# Patient Record
(demographics unavailable — no encounter records)

---

## 2025-02-27 NOTE — ASSESSMENT
[FreeTextEntry1] : 59 year old male with past medical history of CLL, HTN, Diabetes Mellitus Type 2, who presents for management of his diabetes  1. DM 2- controlled, uncomplicated Patient counseled on the importance of diabetic control and complications. Patient's diet and the necessary changes discussed. Reviewed over freestyle maxine and use. Reviewed over glycemic goals.  Cont Metformin  mg QD Check fsg Cont diabetic diet Cont exercise Labs UTD Opthalmology Visit UTD Foot Exam up to date  2. Elevated TSH Patients father has thyroid disease Antibodies negative Patient with no symptoms Will send for US Will monior for now

## 2025-02-27 NOTE — HISTORY OF PRESENT ILLNESS
[FreeTextEntry1] : Mr. HAZEL HERNANDEZ  is a 60 year old male with past medical history of CLL, HTN, Diabetes Mellitus Type 2  who presents for management of his diabetes.  POCT Glucose:  mg/dL POCT Hga1c:%   Diabetes first diagnosed: 2017 Type: 2  Current diabetic regimen: Metformin 500 mg BID  Other relevant medications:  Self monitoring blood glucose : Freestyle Dawson  AGP Report:  (2/13/25-2/26/25) High: 4% Target (): 96% Low: 0%  Hypoglycemia: none  Diet: limited carbs   Exercise:none  Urine micro:wnl (2/2023), wnl (5/2022), prot/cre 0.4 (2/2022) lipid profile:LDL 65 (2/2023)LDL 63 (5/2022), LDL 58 (4/2021), LDL 67 (11/2018) last hba1c:6.9% (2/2023), 6% (9/2022), 6.3% (5/2022), 7% (2/2022), 6.2% (7/2021), 6.2% (4/2021),  6.8% (1/2019) eye exam:2024 diabetic foot exam/podiatry:6/2024 in office

## 2025-02-27 NOTE — PHYSICAL EXAM
[Alert] : alert [No Acute Distress] : no acute distress [Well Developed] : well developed [Normal Voice/Communication] : normal voice communication [No Rash] : no rash [Right foot was examined, including] : right foot ~C was examined, including visual inspection with sensory and pulse exams [Left foot was examined, including] : left foot ~C was examined, including visual inspection with sensory and pulse exams [Normal] : normal [Full ROM] : with full range of motion [2+] : 2+ in the dorsalis pedis [Oriented x3] : oriented to person, place, and time [Diminished Throughout Both Feet] : normal tactile sensation with monofilament testing throughout both feet